# Patient Record
Sex: FEMALE | Race: BLACK OR AFRICAN AMERICAN | NOT HISPANIC OR LATINO | Employment: FULL TIME | URBAN - METROPOLITAN AREA
[De-identification: names, ages, dates, MRNs, and addresses within clinical notes are randomized per-mention and may not be internally consistent; named-entity substitution may affect disease eponyms.]

---

## 2024-01-23 ENCOUNTER — OFFICE VISIT (OUTPATIENT)
Dept: URGENT CARE | Facility: CLINIC | Age: 59
End: 2024-01-23
Payer: COMMERCIAL

## 2024-01-23 VITALS
HEIGHT: 63 IN | DIASTOLIC BLOOD PRESSURE: 88 MMHG | HEART RATE: 76 BPM | SYSTOLIC BLOOD PRESSURE: 158 MMHG | WEIGHT: 156 LBS | TEMPERATURE: 98 F | RESPIRATION RATE: 20 BRPM | BODY MASS INDEX: 27.64 KG/M2 | OXYGEN SATURATION: 99 %

## 2024-01-23 DIAGNOSIS — L72.9 SKIN CYST: Primary | ICD-10-CM

## 2024-01-23 PROCEDURE — 99203 OFFICE O/P NEW LOW 30 MIN: CPT | Mod: S$GLB,,,

## 2024-01-23 RX ORDER — DORZOLAMIDE HYDROCHLORIDE AND TIMOLOL MALEATE 20; 5 MG/ML; MG/ML
SOLUTION/ DROPS OPHTHALMIC
COMMUNITY
Start: 2024-01-19

## 2024-01-23 RX ORDER — LISINOPRIL 40 MG/1
1 TABLET ORAL DAILY
COMMUNITY
Start: 2023-08-24

## 2024-01-23 RX ORDER — LATANOPROST 50 UG/ML
1 SOLUTION/ DROPS OPHTHALMIC
COMMUNITY
Start: 2023-12-12

## 2024-01-23 RX ORDER — FLUCONAZOLE 150 MG/1
TABLET ORAL
COMMUNITY
Start: 2023-02-26

## 2024-01-23 RX ORDER — SEMAGLUTIDE 0.68 MG/ML
INJECTION, SOLUTION SUBCUTANEOUS
COMMUNITY
Start: 2023-10-13

## 2024-01-23 RX ORDER — ASPIRIN 81 MG/1
81 TABLET ORAL
COMMUNITY

## 2024-01-23 RX ORDER — DOXYCYCLINE 100 MG/1
100 CAPSULE ORAL EVERY 12 HOURS
Qty: 14 CAPSULE | Refills: 0 | Status: SHIPPED | OUTPATIENT
Start: 2024-01-23 | End: 2024-01-30

## 2024-01-23 RX ORDER — BUDESONIDE AND FORMOTEROL FUMARATE DIHYDRATE 160; 4.5 UG/1; UG/1
2 AEROSOL RESPIRATORY (INHALATION)
COMMUNITY

## 2024-01-23 RX ORDER — AMLODIPINE BESYLATE 5 MG/1
5 TABLET ORAL
COMMUNITY
Start: 2023-02-26 | End: 2024-02-26

## 2024-01-23 RX ORDER — METFORMIN HYDROCHLORIDE 500 MG/1
1 TABLET, EXTENDED RELEASE ORAL 2 TIMES DAILY
COMMUNITY
Start: 2024-01-09

## 2024-01-23 RX ORDER — MUPIROCIN 20 MG/G
OINTMENT TOPICAL 2 TIMES DAILY
Qty: 22 G | Refills: 0 | Status: SHIPPED | OUTPATIENT
Start: 2024-01-23

## 2024-01-23 RX ORDER — ATORVASTATIN CALCIUM 40 MG/1
40 TABLET, FILM COATED ORAL
COMMUNITY

## 2024-01-23 NOTE — PATIENT INSTRUCTIONS
Apply warm compress daily.  Take doxycycline twice a day for 7 days.  Wash daily with mild soap and water.  Apply Bactroban twice a day.  Please follow-up with dermatology to have cyst removed. Call [phone] for appointment.    What care is needed at home?   Call your regular doctor to let them know you were in the ED. Make a follow-up appointment if you were told to.  Apply warm compresses to the area of the boil for 15 to 30 minutes at least 4 times each day.  After the boil has opened, wash the area with soap and water 1 to 2 times each day until it is healed. Cover the area with a clean bandage.  Wash your hands before and after you touch your boil or dressing.  If you were given an antibiotic, be sure take all of it as ordered.  When do I need to get emergency help?   Return to the ED if:   The boil becomes much larger.  You notice one or more red lines moving from the boil area up your arm or leg towards your body.  When do I need to call the doctor?   You have a fever of 100.4°F (38°C) or higher.  Your boil starts to cause very bad pain.  Your boil gets better, but then comes back.  You have new or worsening symptoms.  - Rest.    - Drink plenty of fluids.    - Acetaminophen (tylenol) or Ibuprofen (advil,motrin) as directed as needed for fever/pain. Avoid tylenol if you have a history of liver disease. Do not take ibuprofen if you have a history of GI bleeding, kidney disease, or if you take blood thinners.     - Follow up with your PCP or specialty clinic as directed in the next 1-2 weeks if not improved or as needed.  You can call [phone] to schedule an appointment with the appropriate provider.    - Go to the ER or seek medical attention immediately if you develop new or worsening symptoms.     - You must understand that you have received an Urgent Care treatment only and that you may be released before all of your medical problems are known or treated.   - You, the patient, will arrange for  follow up care as instructed.   - If your condition worsens or fails to improve we recommend that you receive another evaluation at the ER immediately or contact your PCP to discuss your concerns or return here.

## 2024-01-23 NOTE — LETTER
January 23, 2024      [facility] [place]  [address]  [place] [address]  Phone: [phone]  Fax: [phone]       Patient: Oriana Meehan   YOB: 1965  Date of Visit: 01/23/2024    To Whom It May Concern:    Keely Meehan  was at [facility] on 01/23/2024. The patient may return to work on 1/24/24 with no restrictions. If you have any questions or concerns, or if I can be of further assistance, please do not hesitate to contact me.    Sincerely,    Charanjit London NP

## 2024-01-23 NOTE — PROGRESS NOTES
"Subjective:      Patient ID: Oriana Meehan is a 58 y.o. female.    Vitals:  height is 5' 3" (1.6 m) and weight is 70.8 kg (156 lb). Her temperature is 97.9 °F (36.6 °C). Her blood pressure is 158/88 (abnormal) and her pulse is 76. Her respiration is 20 and oxygen saturation is 99%.     Chief Complaint: Abscess    58-year-old female patient states she has a boil to her posterior neck ongoing for a couple of weeks.  Patient states she has been applying warm compresses daily with no relief. Patient states she noticed some mild discharge. Patient was seen in the ER yesterday but states she left due to long wait time.  Patient denies any fever or any other associated symptoms.        Abscess  Chronicity:  NewProgression Since Onset: gradually worsening  Location:  Head/neck  Associated Symptoms: no fever, no chills, no sweats  Characteristics: painful and redness    Characteristics: not draining, no itching, no dryness, no scaling, no peeling, no swelling, no bruising and no blistering    Pain Scale:  10/10  Treatments Tried:  Warm compresses (ibuprofen)  Relieved by:  Nothing  Worsened by:  Nothing      Constitution: Negative for activity change, appetite change, chills, sweating, fatigue and fever.   HENT:  Negative for ear pain, ear discharge, foreign body in ear, tinnitus, hearing loss and dental problem.    Neck: Negative for neck pain, neck stiffness, painful lymph nodes, neck swelling and degenerative disc disease.   Cardiovascular:  Negative for chest trauma, chest pain, leg swelling and palpitations.   Eyes:  Negative for eye trauma, foreign body in eye, eye discharge, eye itching and eye pain.   Respiratory:  Negative for sleep apnea, chest tightness, cough, sputum production, bloody sputum, COPD, shortness of breath and asthma.    Gastrointestinal:  Negative for abdominal trauma, abdominal pain, abdominal bloating, history of abdominal surgery, nausea and vomiting.   Endocrine: hair loss, cold intolerance " and heat intolerance.   Genitourinary:  Negative for dysuria, frequency, urgency, urine decreased and flank pain.   Musculoskeletal:  Negative for pain, trauma, joint pain and joint swelling.   Skin:  Negative for color change, pale, rash, wound, abrasion, laceration, lesion, erythema and abscess.        Cyst to posterior neck   Allergic/Immunologic: Negative for environmental allergies, seasonal allergies, food allergies, eczema, asthma and immunocompromised state.   Neurological:  Negative for dizziness, history of vertigo, light-headedness, passing out, disorientation and altered mental status.   Hematologic/Lymphatic: Negative for swollen lymph nodes, easy bruising/bleeding, trouble clotting and history of blood clots. Does not bruise/bleed easily.   Psychiatric/Behavioral:  Negative for altered mental status, disorientation, confusion and agitation.       Objective:     Physical Exam   Constitutional: She is oriented to person, place, and time. She appears well-developed.   HENT:   Head: Normocephalic and atraumatic. Head is without abrasion, without contusion and without laceration.   Ears:   Right Ear: External ear normal.   Left Ear: External ear normal.   Nose: Nose normal.   Mouth/Throat: Oropharynx is clear and moist and mucous membranes are normal.   Eyes: Conjunctivae, EOM and lids are normal. Pupils are equal, round, and reactive to light.   Neck: Trachea normal and phonation normal. Neck supple.   Cardiovascular: Normal rate, regular rhythm and normal heart sounds.   Pulmonary/Chest: Effort normal and breath sounds normal. No stridor. No respiratory distress.   Musculoskeletal: Normal range of motion.         General: No swelling, tenderness, deformity or signs of injury. Normal range of motion.      Right lower leg: No edema.      Left lower leg: No edema.   Neurological: She is alert and oriented to person, place, and time.   Skin: Skin is warm, dry, intact, not pale and no rash. Capillary refill  takes less than 2 seconds. No abrasion, No burn, No bruising, No erythema, No ecchymosis and No lesion jaundice  Psychiatric: Her speech is normal and behavior is normal. Judgment and thought content normal.   Nursing note and vitals reviewed.      Assessment:     1. Skin cyst        Plan:       Skin cyst  -     doxycycline (VIBRAMYCIN) 100 MG Cap; Take 1 capsule (100 mg total) by mouth every 12 (twelve) hours. for 7 days  Dispense: 14 capsule; Refill: 0  -     mupirocin (BACTROBAN) 2 % ointment; Apply topically 2 (two) times daily.  Dispense: 22 g; Refill: 0  -     Ambulatory referral/consult to Dermatology             Additional MDM:     Heart Failure Score:   COPD = No

## 2024-01-31 ENCOUNTER — OFFICE VISIT (OUTPATIENT)
Dept: DERMATOLOGY | Facility: CLINIC | Age: 59
End: 2024-01-31
Payer: COMMERCIAL

## 2024-01-31 DIAGNOSIS — L81.0 POST-INFLAMMATORY HYPERPIGMENTATION: ICD-10-CM

## 2024-01-31 DIAGNOSIS — L72.11 PILAR CYST: Primary | ICD-10-CM

## 2024-01-31 PROCEDURE — 99999 PR PBB SHADOW E&M-EST. PATIENT-LVL III: CPT | Mod: PBBFAC,,, | Performed by: STUDENT IN AN ORGANIZED HEALTH CARE EDUCATION/TRAINING PROGRAM

## 2024-01-31 PROCEDURE — 1160F RVW MEDS BY RX/DR IN RCRD: CPT | Mod: CPTII,S$GLB,, | Performed by: STUDENT IN AN ORGANIZED HEALTH CARE EDUCATION/TRAINING PROGRAM

## 2024-01-31 PROCEDURE — 1159F MED LIST DOCD IN RCRD: CPT | Mod: CPTII,S$GLB,, | Performed by: STUDENT IN AN ORGANIZED HEALTH CARE EDUCATION/TRAINING PROGRAM

## 2024-01-31 PROCEDURE — 4010F ACE/ARB THERAPY RXD/TAKEN: CPT | Mod: CPTII,S$GLB,, | Performed by: STUDENT IN AN ORGANIZED HEALTH CARE EDUCATION/TRAINING PROGRAM

## 2024-01-31 PROCEDURE — 99203 OFFICE O/P NEW LOW 30 MIN: CPT | Mod: S$GLB,,, | Performed by: STUDENT IN AN ORGANIZED HEALTH CARE EDUCATION/TRAINING PROGRAM

## 2024-01-31 RX ORDER — HYDROQUINONE 40 MG/G
CREAM TOPICAL
Qty: 30 G | Refills: 1 | Status: SHIPPED | OUTPATIENT
Start: 2024-01-31 | End: 2024-04-03 | Stop reason: SDUPTHER

## 2024-01-31 NOTE — PROGRESS NOTES
Subjective:      Patient ID:  Oriana Meehan is a 58 y.o. female who presents for   Chief Complaint   Patient presents with    Cyst     Back of neck     Oriana Meehan is a 58 y.o. female who presents for: evaluation of skin lesions/cysts.    New patient    Cyst  Location: back of neck  Duration: 2 weeks  Symptoms: redness, swelling, purulent drainage, itching  Relieving factors/Previous treatments: mupirocin 2% ointment topical and doxycycline abx 100mg PO BID X 7 days since 01/23/24    Also bothered by dark spots on legs after ant bites 1 year ago    Pertinent history:  History of blistering sunburns: No  History of tanning bed use: No  Family history of melanoma: No  Personal history of mole removal: No  Personal history of skin cancer: No     Pt denies any other concerns today.      Review of Systems   Skin:  Positive for itching. Negative for tendency to form keloidal scars.   Hematologic/Lymphatic: Does not bruise/bleed easily.       Objective:   Physical Exam   Skin:   Areas Examined (abnormalities noted in diagram):   Head / Face Inspection Performed  Neck Inspection Performed            Diagram Legend     Erythematous scaling macule/papule c/w actinic keratosis       Vascular papule c/w angioma      Pigmented verrucoid papule/plaque c/w seborrheic keratosis      Yellow umbilicated papule c/w sebaceous hyperplasia      Irregularly shaped tan macule c/w lentigo     1-2 mm smooth white papules consistent with Milia      Movable subcutaneous cyst with punctum c/w epidermal inclusion cyst      Subcutaneous movable cyst c/w pilar cyst      Firm pink to brown papule c/w dermatofibroma      Pedunculated fleshy papule(s) c/w skin tag(s)      Evenly pigmented macule c/w junctional nevus     Mildly variegated pigmented, slightly irregular-bordered macule c/w mildly atypical nevus      Flesh colored to evenly pigmented papule c/w intradermal nevus       Pink pearly papule/plaque c/w basal cell carcinoma       Erythematous hyperkeratotic cursted plaque c/w SCC      Surgical scar with no sign of skin cancer recurrence      Open and closed comedones      Inflammatory papules and pustules      Verrucoid papule consistent consistent with wart     Erythematous eczematous patches and plaques     Dystrophic onycholytic nail with subungual debris c/w onychomycosis     Umbilicated papule    Erythematous-base heme-crusted tan verrucoid plaque consistent with inflamed seborrheic keratosis     Erythematous Silvery Scaling Plaque c/w Psoriasis     See annotation      Assessment / Plan:        Pilar cyst  R occipital scalp inflamed, 2 cm and left occipital scalp 8 mm  Reassurance provided, reviewed risks of cyst excision including scar, infection, recurrence and pt wanted to proceed with cyst excision  Can do both in same day if pt desires  Finish course of doxycycline- no drainage today    Post-inflammatory hyperpigmentation  Hyperpigmented patches on shins from prior ant bites  -     hydroquinone 4 % Crea; Apply nightly for 3 months on, then take one month break  Dispense: 30 g; Refill: 1  Reviewed risks paradoxical worsening if used longer than 3 months  If HQ 4% not covered can [facility] recommend to contact office    RTC for excision

## 2024-04-03 DIAGNOSIS — L81.0 POST-INFLAMMATORY HYPERPIGMENTATION: ICD-10-CM

## 2024-04-03 RX ORDER — HYDROQUINONE 40 MG/G
CREAM TOPICAL
Qty: 30 G | Refills: 1 | Status: SHIPPED | OUTPATIENT
Start: 2024-04-03

## 2024-04-03 NOTE — TELEPHONE ENCOUNTER
Called to confirm pt's excision procedure appt for tomorrow. Pt stated that the cyst has gone away so she no longer needs the appointment. Informed pt that I would get that appt cancelled for her. Pt did request a refill on her bleaching cream. Informed pt that I would let Dr. Dawson know to see if that was possible. She thanked me for the help.